# Patient Record
Sex: FEMALE | Race: WHITE | Employment: FULL TIME | URBAN - METROPOLITAN AREA
[De-identification: names, ages, dates, MRNs, and addresses within clinical notes are randomized per-mention and may not be internally consistent; named-entity substitution may affect disease eponyms.]

---

## 2017-03-06 ENCOUNTER — OFFICE VISIT (OUTPATIENT)
Dept: FAMILY MEDICINE CLINIC | Age: 27
End: 2017-03-06

## 2017-03-06 VITALS
HEIGHT: 67 IN | OXYGEN SATURATION: 97 % | WEIGHT: 211 LBS | DIASTOLIC BLOOD PRESSURE: 57 MMHG | RESPIRATION RATE: 16 BRPM | SYSTOLIC BLOOD PRESSURE: 130 MMHG | TEMPERATURE: 99 F | BODY MASS INDEX: 33.12 KG/M2 | HEART RATE: 100 BPM

## 2017-03-06 DIAGNOSIS — J45.40 REACTIVE AIRWAY DISEASE, MODERATE PERSISTENT, UNCOMPLICATED: Primary | ICD-10-CM

## 2017-03-06 DIAGNOSIS — J06.9 ACUTE URI: ICD-10-CM

## 2017-03-06 RX ORDER — PREDNISONE 20 MG/1
40 TABLET ORAL DAILY
Qty: 10 TAB | Refills: 0 | Status: SHIPPED | OUTPATIENT
Start: 2017-03-06 | End: 2017-03-11

## 2017-03-06 RX ORDER — DEXAMETHASONE SODIUM PHOSPHATE 4 MG/ML
10 INJECTION, SOLUTION INTRA-ARTICULAR; INTRALESIONAL; INTRAMUSCULAR; INTRAVENOUS; SOFT TISSUE ONCE
Qty: 2.5 ML | Refills: 0
Start: 2017-03-06 | End: 2017-03-06

## 2017-03-06 RX ORDER — ERYTHROMYCIN 500 MG/1
500 TABLET, DELAYED RELEASE ORAL 3 TIMES DAILY
Qty: 21 TAB | Refills: 0 | Status: SHIPPED | OUTPATIENT
Start: 2017-03-06 | End: 2017-03-07 | Stop reason: ALTCHOICE

## 2017-03-06 NOTE — LETTER
NOTIFICATION RETURN TO WORK / SCHOOL 
 
3/6/2017 11:46 AM 
 
Ms. Richard Gupta [address] [address] To Whom It May Concern: Richard Gupta is currently under the care of [address] [place] [address]. She will return to work/school on March 9, 2017. If there are questions or concerns please have the patient contact our office.  
 
 
 
Sincerely, 
 
 
Miguel Levin NP

## 2017-03-06 NOTE — PROGRESS NOTES
Everett Berrios is a 33 y.o. female here today for cough and congestion. seen a few weeks ago at urgent care and give z-pack and tessalon pearles    1. Have you been to the ER, urgent care clinic or hospitalized since your last visit? Yes urgent care now care a few weeks ago     2. Have you seen or consulted any other health care providers outside of the [address] [place] [address] since your last visit (Include any pap smears or colon screening)? NO      Do you have an Advanced Directive? NO    Would you like information on Advanced Directives? NO    Learning Assessment 6/23/2016   PRIMARY LEARNER Patient   HIGHEST LEVEL OF EDUCATION - PRIMARY LEARNER  GRADUATED HIGH SCHOOL OR GED   PRIMARY LANGUAGE ENGLISH   LEARNER PREFERENCE PRIMARY DEMONSTRATION   ANSWERED BY patient   RELATIONSHIP SELF       After obtaining consent, and per orders of KATLYN Costa  injection of decadron 10 mg's  given by Aissatou Carl LPN. Patient instructed to remain in clinic for 20 minutes afterwards, and to report any adverse reaction to me immediately.

## 2017-03-06 NOTE — PROGRESS NOTES
HISTORY OF PRESENT ILLNESS  Dorie Banda is a 33 y.o. female. HPI  Patient presents for treatment of URI symptoms of paroxysmal cough occasionally productive for yellow sputum and tight, wheezy feeling to chest present for 48 hours. She describes a tight, heavy chest feeling at times. She was treated for \"pneumonia\" last December. She is a nonsmoker. No international travel, no other family members have similar symptoms. No treatment to date other than OTC. Symptom onset acute, timing constant, pain mild to moderate. Review of Systems   Constitutional: Positive for malaise/fatigue. Negative for diaphoresis and fever. HENT: Positive for congestion. Negative for ear discharge, ear pain and sore throat. Eyes: Negative. Negative for pain and discharge. Respiratory: Positive for cough, sputum production and wheezing. Negative for shortness of breath and stridor. Occasional blood tinged sputum. Cardiovascular: Negative. Negative for chest pain, palpitations, orthopnea, claudication and leg swelling. Gastrointestinal: Negative. Negative for abdominal pain, nausea and vomiting. Musculoskeletal: Negative. Negative for back pain, joint pain, myalgias and neck pain. Skin: Negative. Negative for itching and rash. Neurological: Negative. Negative for dizziness, focal weakness, weakness and headaches. Endo/Heme/Allergies: Negative. Negative for environmental allergies. Physical Exam   Constitutional: She is oriented to person, place, and time. She appears well-developed and well-nourished. No distress. HENT:   Head: Normocephalic and atraumatic. Left Ear: External ear normal.   Mouth/Throat: No oropharyngeal exudate. Eyes: EOM are normal. Pupils are equal, round, and reactive to light. Right eye exhibits no discharge. Left eye exhibits no discharge. No scleral icterus. Sclera red, conjunctiva injected. Neck: Normal range of motion. Neck supple.  No tracheal deviation present. No thyromegaly present. Cardiovascular: Normal rate, regular rhythm, normal heart sounds and intact distal pulses. Exam reveals no gallop and no friction rub. No murmur heard. Pulmonary/Chest: Effort normal. No stridor. She has wheezes. She has no rales. She exhibits no tenderness. Abdominal: Soft. Bowel sounds are normal. She exhibits no distension. There is no tenderness. There is no rebound and no guarding. Musculoskeletal: Normal range of motion. She exhibits no edema, tenderness or deformity. Neurological: She is alert and oriented to person, place, and time. No cranial nerve deficit. She exhibits normal muscle tone. Coordination normal.   Skin: Skin is warm and dry. No rash noted. She is not diaphoretic. No erythema. No pallor. Psychiatric: She has a normal mood and affect. Her behavior is normal. Judgment and thought content normal.   Nursing note and vitals reviewed. Clinic Treatment:  Decadron 10 mg IM    ASSESSMENT and PLAN  1. Acute URI  2. Bronchitis with bronchospasm   -Prednisone taper x 5 days   -Erythromycin 500 mg tid x 7 days. Patient is in agreement with the treatment plan. Return to the clinic if needed. All questions answered and discharge instructions reviewed with the patient.

## 2017-03-06 NOTE — MR AVS SNAPSHOT
Visit Information Date & Time Provider Department Dept. Phone Encounter #  
 3/6/2017 11:30 AM Sean Lawrence, [facility] [phone] [id number] Upcoming Health Maintenance Date Due  
 HPV AGE 9Y-34Y (1 of 3 - Female 3 Dose Series) 2/15/2002 INFLUENZA AGE 9 TO ADULT 8/1/2016 PAP AKA CERVICAL CYTOLOGY 6/1/2017 DTaP/Tdap/Td series (2 - Td) 6/1/2024 Allergies as of 3/6/2017  Review Complete On: 3/6/2017 By: Sean Lawrence NP No Known Allergies Current Immunizations  Never Reviewed No immunizations on file. Not reviewed this visit You Were Diagnosed With   
  
 Codes Comments Reactive airway disease, moderate persistent, uncomplicated    -  Primary ICD-10-CM: J45.40 ICD-9-CM: 493.90 Acute URI     ICD-10-CM: J06.9 ICD-9-CM: 465.9 Vitals BP Pulse Temp Resp Height(growth percentile) Weight(growth percentile) 130/57 (BP 1 Location: Left arm, BP Patient Position: Sitting) 100 99 °F (37.2 °C) (Oral) 16 5' 6.5\" (1.689 m) 211 lb (95.7 kg) LMP SpO2 BMI OB Status Smoking Status 02/22/2017 97% 33.55 kg/m2 Having regular periods Never Smoker Vitals History BMI and BSA Data Body Mass Index Body Surface Area  
 33.55 kg/m 2 2.12 m 2 Preferred Pharmacy Pharmacy Name Phone [facility] [address], [address] [phone] Your Updated Medication List  
  
   
This list is accurate as of: 3/6/17 11:44 AM.  Always use your most recent med list.  
  
  
  
  
 dexamethasone 4 mg/mL injection Commonly known as:  DECADRON  
2.5 mL by IntraMUSCular route once for 1 dose. erythromycin 500 mg tablet Commonly known as:  GARCÍA-TAB Take 1 Tab by mouth three (3) times daily for 7 days. predniSONE 20 mg tablet Commonly known as:  Tylor Felling Take 2 Tabs by mouth daily for 5 days. Prescriptions Sent to Pharmacy Refills  
 erythromycin (GARCÍA-TAB) 500 mg tablet 0 Sig: Take 1 Tab by mouth three (3) times daily for 7 days. Class: Normal  
 Pharmacy: [facility] [address] [place] [address] Rd, [address] #: [phone] Route: Oral  
 predniSONE (DELTASONE) 20 mg tablet 0 Sig: Take 2 Tabs by mouth daily for 5 days. Class: Normal  
 Pharmacy: [facility] [address], [address] #: [phone] Route: Oral  
  
We Performed the Following DEXAMETHASONE SODIUM PHOSPHATE INJECTION 1 MG [ [facility]] AK THER/PROPH/DIAG INJECTION, SUBCUT/IM [id number] CPT(R)] To-Do List   
 03/06/2017 Imaging:  XR CHEST PA LAT Introducing [facility] SERVICES! Dear Belen Brand: 
Thank you for requesting a [facility] account. Our records indicate that you already have an active [facility] account. You can access your account anytime at [email or web address] Did you know that you can access your hospital and ER discharge instructions at any time in [facility]? You can also review all of your test results from your hospital stay or ER visit. Additional Information If you have questions, please visit the Frequently Asked Questions section of the [employer] website at [email or web address] Remember, [facility] is NOT to be used for urgent needs. For medical emergencies, dial 911. Now available from your iPhone and Android! Please provide this summary of care documentation to your next provider. Your primary care clinician is listed as Sindy Norton. If you have any questions after today's visit, please call [phone].

## 2017-03-07 RX ORDER — CODEINE PHOSPHATE AND GUAIFENESIN 10; 100 MG/5ML; MG/5ML
5 SOLUTION ORAL
Qty: 180 ML | Refills: 0 | Status: SHIPPED | OUTPATIENT
Start: 2017-03-07

## 2017-03-07 RX ORDER — AZITHROMYCIN 250 MG/1
TABLET, FILM COATED ORAL
Qty: 6 TAB | Refills: 0 | Status: SHIPPED | OUTPATIENT
Start: 2017-03-07 | End: 2017-03-12

## 2019-05-31 ENCOUNTER — OFFICE VISIT (OUTPATIENT)
Dept: INTERNAL MEDICINE CLINIC | Age: 29
End: 2019-05-31

## 2019-05-31 VITALS
SYSTOLIC BLOOD PRESSURE: 104 MMHG | BODY MASS INDEX: 36.82 KG/M2 | OXYGEN SATURATION: 96 % | TEMPERATURE: 98.1 F | HEART RATE: 101 BPM | HEIGHT: 65 IN | RESPIRATION RATE: 18 BRPM | WEIGHT: 221 LBS | DIASTOLIC BLOOD PRESSURE: 68 MMHG

## 2019-05-31 DIAGNOSIS — J01.90 ACUTE NON-RECURRENT SINUSITIS, UNSPECIFIED LOCATION: Primary | ICD-10-CM

## 2019-05-31 DIAGNOSIS — E66.01 SEVERE OBESITY (HCC): ICD-10-CM

## 2019-05-31 RX ORDER — AMOXICILLIN AND CLAVULANATE POTASSIUM 875; 125 MG/1; MG/1
1 TABLET, FILM COATED ORAL 2 TIMES DAILY
Qty: 20 TAB | Refills: 0 | Status: SHIPPED | OUTPATIENT
Start: 2019-05-31 | End: 2019-06-10

## 2019-05-31 RX ORDER — METOCLOPRAMIDE 5 MG/1
5 TABLET ORAL
COMMUNITY

## 2019-05-31 NOTE — PROGRESS NOTES
HPI:     This is a 30year old non-smoker who is currently breastfeeding who presents to the office with several day history of rhinorrhea, post nasal drainage, sore throat, chest tightness and cough productive of yellow to green phlegm. She denies any fever or chills. Her  and one of her children have been ill. She has not taken anything OTC. She is on Reglan to enhance breast mild production which was prescribed by her OB GYN, Dr. Javed Chong. She denies any side effect from the use of this medication. She expects to continue breastfeeding until . History reviewed. No pertinent past medical history. Past Surgical History:   Procedure Laterality Date    HX  SECTION      HX HAMMER TOE REPAIR      HX HEENT      tear duct clogged     Current Outpatient Medications   Medication Sig    metoclopramide HCl (REGLAN) 5 mg tablet Take 5 mg by mouth Before breakfast, lunch, and dinner.  guaiFENesin-codeine (ROBITUSSIN AC) 100-10 mg/5 mL solution Take 5 mL by mouth nightly as needed for Cough. Max Daily Amount: 5 mL. No current facility-administered medications for this visit. Allergies and Intolerances:   No Known Allergies     Family History:   Family History   Problem Relation Age of Onset    Arthritis-osteo Mother     Cancer Father         lung    Cancer Paternal Uncle         melanoma    Heart Disease Maternal Grandmother      Social History:   She  reports that she has never smoked.  She has never used smokeless tobacco.   Social History     Substance and Sexual Activity   Alcohol Use Yes    Comment: social     Immunization History:    Flu vaccine and Tdap        Physical:   Visit Vitals  /68 (BP 1 Location: Left arm, BP Patient Position: Sitting)   Pulse (!) 101   Temp 98.1 °F (36.7 °C) (Oral)   Resp 18   Ht 5' 5\" (1.651 m)   Wt 221 lb (100.2 kg)   SpO2 96%   BMI 36.78 kg/m²          Physical Exam   Constitutional: She is well-developed, well-nourished, and in no distress. Alert lady with intermittent barking cough   HENT:   Right Ear: External ear normal. A middle ear effusion is present. Left Ear: External ear normal. A middle ear effusion is present. Mouth/Throat: Oropharynx is clear and moist.   Eyes: Conjunctivae are normal. No scleral icterus. Cardiovascular: Normal rate, regular rhythm, normal heart sounds and intact distal pulses. Pulmonary/Chest: Breath sounds normal. She has no wheezes. She has no rales. Lymphadenopathy:     She has no cervical adenopathy. Vitals reviewed. Review of Data:  Labs:  No visits with results within 3 Month(s) from this visit. Latest known visit with results is:   Office Visit on 06/23/2016   Component Date Value Ref Range Status    Glucose 06/23/2016 86  65 - 99 mg/dL Final    BUN 06/23/2016 11  6 - 20 mg/dL Final    Creatinine 06/23/2016 0.68  0.57 - 1.00 mg/dL Final    GFR est non-AA 06/23/2016 122  >59 mL/min/1.73 Final    GFR est AA 06/23/2016 141  >59 mL/min/1.73 Final    BUN/Creatinine ratio 06/23/2016 16  8 - 20 Final    Sodium 06/23/2016 139  134 - 144 mmol/L Final    Potassium 06/23/2016 4.7  3.5 - 5.2 mmol/L Final    Chloride 06/23/2016 98  97 - 108 mmol/L Final    CO2 06/23/2016 25  18 - 29 mmol/L Final    Calcium 06/23/2016 9.5  8.7 - 10.2 mg/dL Final    Protein, total 06/23/2016 7.7  6.0 - 8.5 g/dL Final    Albumin 06/23/2016 4.5  3.5 - 5.5 g/dL Final    GLOBULIN, TOTAL 06/23/2016 3.2  1.5 - 4.5 g/dL Final    A-G Ratio 06/23/2016 1.4  1.1 - 2.5 Final    Bilirubin, total 06/23/2016 <0.2  0.0 - 1.2 mg/dL Final    Alk.  phosphatase 06/23/2016 65  39 - 117 IU/L Final    AST (SGOT) 06/23/2016 18  0 - 40 IU/L Final    ALT (SGPT) 06/23/2016 17  0 - 32 IU/L Final    Cholesterol, total 06/23/2016 181  100 - 199 mg/dL Final    Triglyceride 06/23/2016 256* 0 - 149 mg/dL Final    HDL Cholesterol 06/23/2016 33* >39 mg/dL Final    VLDL, calculated 06/23/2016 51* 5 - 40 mg/dL Final    LDL, calculated 06/23/2016 97  0 - 99 mg/dL Final    WBC 06/23/2016 11.0* 3.4 - 10.8 x10E3/uL Final    RBC 06/23/2016 4.86  3.77 - 5.28 x10E6/uL Final    HGB 06/23/2016 13.6  11.1 - 15.9 g/dL Final    HCT 06/23/2016 40.9  34.0 - 46.6 % Final    MCV 06/23/2016 84  79 - 97 fL Final    MCH 06/23/2016 28.0  26.6 - 33.0 pg Final    MCHC 06/23/2016 33.3  31.5 - 35.7 g/dL Final    RDW 06/23/2016 14.4  12.3 - 15.4 % Final    PLATELET 93/68/5222 023* 150 - 379 x10E3/uL Final    TSH 06/23/2016 1.280  0.450 - 4.500 uIU/mL Final    INTERPRETATION 06/23/2016 Note   Final       Impression/Plan:   Patient Active Problem List   Diagnosis  Code    Sinusitis with post nasal drainage and cough Augmentin 875mg bid x 10 day with food (safe for breastfeeding), Saline NS J01.90    Hypertriglyceridemia Will need repeat FLP along with fasting glucose E78.1    Severe obesity ([address] Arrange CPE with Dr. Ivet Alonso to address this issue E66.01         Efraín Collier MD

## 2019-05-31 NOTE — PROGRESS NOTES
Chief Complaint   Patient presents with    Cough     and congestion with yellow/Green mucus    Nasal Congestion     1. Have you been to the ER, urgent care clinic since your last visit? Hospitalized since your last visit? NO    2. Have you seen or consulted any other health care providers outside of the [address] [place] [address] since your last visit? Include any pap smears or colon screening.  No

## 2019-05-31 NOTE — PATIENT INSTRUCTIONS
Sinusitis/Post Nasal Drainage    Augmentin twice a day with food for 10 days ANTIBIOTIC  Cough>>Halls Lozenge  Vitamin C (OJ or Grapefruit)

## 2022-03-20 PROBLEM — E66.01 SEVERE OBESITY (HCC): Status: ACTIVE | Noted: 2019-05-31

## 2023-01-31 RX ORDER — METOCLOPRAMIDE 5 MG/1
5 TABLET ORAL
COMMUNITY

## 2023-01-31 RX ORDER — GUAIFENESIN AND CODEINE PHOSPHATE 100; 10 MG/5ML; MG/5ML
5 SOLUTION ORAL
COMMUNITY
Start: 2017-03-07

## 2023-01-31 RX ORDER — IBUPROFEN 400 MG/1
400 TABLET ORAL EVERY 6 HOURS PRN
COMMUNITY
Start: 2021-07-29